# Patient Record
Sex: FEMALE | URBAN - METROPOLITAN AREA
[De-identification: names, ages, dates, MRNs, and addresses within clinical notes are randomized per-mention and may not be internally consistent; named-entity substitution may affect disease eponyms.]

---
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
## 2025-02-21 NOTE — HPI: PIMPLES (ACNE)
Creatinine had increased slightly on her January 31 lab work at which time her creatinine was 1.77, her baseline creatinine is usually 1.1-1.3, she did have a heart catheterization completed after this, I have asked her to go get her lab work completed today so we can check her BUN and creatinine, her blood pressure is well-controlled, diabetes fairly well-controlled, on SGLT2  
What Type Of Note Output Would You Prefer (Optional)?: Bullet Format
How Severe Is Your Acne?: mild
Is This A New Presentation, Or A Follow-Up?: Acne